# Patient Record
Sex: MALE | Race: ASIAN | NOT HISPANIC OR LATINO | URBAN - METROPOLITAN AREA
[De-identification: names, ages, dates, MRNs, and addresses within clinical notes are randomized per-mention and may not be internally consistent; named-entity substitution may affect disease eponyms.]

---
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
## 2021-05-30 VITALS — WEIGHT: 171 LBS

## 2021-06-10 NOTE — PROGRESS NOTES
A/P:  1. Allergic response       Hive like rash, likely from anti-diarrheal agent that he took yesterday.     Allergic reaction  Take an antihistamine such as claritin, zyrtec or allegra. You can take Benadryl before bedtime as it will lessen the itching and make you drowsy.   Use a cool cloth to lessen itching  Avoid scratching as much as possible  You may the Gold bond anti-itch cream  Monitor for any possible triggers. Avoid if trigger is identified.   Return to clinic if symptoms are not improving as expected or if worsening in any way. If you begin to experience any difficulty breathing, call 911 and get to the ER.      SUBJECTIVE:  Patrick Boyce is a 16 y.o. male presents with dad for 1 days complaint of rash. Rash was first noticed arms and chest.  Now is everywhere.  Rash is pruritic. Rash is not weeping or discharging.  Denies chest pain, SOB, dyspnea, wheezing, tongue or oral edema. No contacts with similiar rash. He had taken an anti-diarrheal pill about 2 -3 hours prior to onset of rash.   He has not had any other exposure to new soaps, cosmetics, detergents, fabric softners, animals, plants or anything else that could have caused this rash.  He denies hx of dry skin and/or prior similar problems. Has tried OTC Gold bond itch relief cream with some relief.     No past medical history on file.    Current Medications:  No current outpatient prescriptions on file prior to visit.     No current facility-administered medications on file prior to visit.         Allergies:  No Known Allergies      OBJECTIVE:    /60  Pulse 72  Temp 98.5  F (36.9  C) (Oral)   Resp 16  Wt 171 lb (77.6 kg)  SpO2 100%    Physical exam reveals a pleasant 16 y.o. male.   Appears healthy, alert and cooperative.  Oropharynx:  normal. Free of erythema, inflammation or exudate.  Lungs: Chest is clear, no wheezing or rales. Symmetric air entry throughout both lung fields.  Heart: regular rate and rhythm, no murmur, rub or gallop    Skin: He has erythematous macular rash all over arms, legs, trunk and face. No vesicles, pustules, scale or crust.  There is no evidence of secondary infection.  No significant associated edema or induration.    Neuro: rash does not follow a dermatone.  No neuropathy.